# Patient Record
Sex: FEMALE | Race: WHITE | URBAN - METROPOLITAN AREA
[De-identification: names, ages, dates, MRNs, and addresses within clinical notes are randomized per-mention and may not be internally consistent; named-entity substitution may affect disease eponyms.]

---

## 2017-03-20 ENCOUNTER — HISTORICAL (OUTPATIENT)
Dept: RADIOLOGY | Facility: HOSPITAL | Age: 67
End: 2017-03-20

## 2017-03-22 ENCOUNTER — HISTORICAL (OUTPATIENT)
Dept: SURGERY | Facility: HOSPITAL | Age: 67
End: 2017-03-22

## 2017-04-11 ENCOUNTER — HISTORICAL (OUTPATIENT)
Dept: LAB | Facility: HOSPITAL | Age: 67
End: 2017-04-11

## 2017-04-24 ENCOUNTER — HISTORICAL (OUTPATIENT)
Dept: MEDSURG UNIT | Facility: HOSPITAL | Age: 67
End: 2017-04-24

## 2017-05-25 ENCOUNTER — HISTORICAL (OUTPATIENT)
Dept: LAB | Facility: HOSPITAL | Age: 67
End: 2017-05-25

## 2017-05-25 LAB
ABS NEUT (OLG): 4.69 X10(3)/MCL (ref 2.1–9.2)
BASOPHILS # BLD AUTO: 0.1 X10(3)/MCL (ref 0–0.2)
BASOPHILS NFR BLD AUTO: 1 %
BUN SERPL-MCNC: 13 MG/DL (ref 7–18)
CALCIUM SERPL-MCNC: 9.4 MG/DL (ref 8.5–10.1)
CHLORIDE SERPL-SCNC: 107 MMOL/L (ref 98–107)
CO2 SERPL-SCNC: 26 MMOL/L (ref 21–32)
CREAT SERPL-MCNC: 0.88 MG/DL (ref 0.55–1.02)
EOSINOPHIL # BLD AUTO: 0.2 X10(3)/MCL (ref 0–0.9)
EOSINOPHIL NFR BLD AUTO: 3 %
ERYTHROCYTE [DISTWIDTH] IN BLOOD BY AUTOMATED COUNT: 12.6 % (ref 11.5–17)
GLUCOSE SERPL-MCNC: 110 MG/DL (ref 74–106)
HCT VFR BLD AUTO: 40.5 % (ref 37–47)
HGB BLD-MCNC: 13.8 GM/DL (ref 12–16)
LYMPHOCYTES # BLD AUTO: 2.2 X10(3)/MCL (ref 0.6–4.6)
LYMPHOCYTES NFR BLD AUTO: 28 %
MCH RBC QN AUTO: 29.6 PG (ref 27–31)
MCHC RBC AUTO-ENTMCNC: 34.1 GM/DL (ref 33–36)
MCV RBC AUTO: 86.9 FL (ref 80–94)
MONOCYTES # BLD AUTO: 0.5 X10(3)/MCL (ref 0.1–1.3)
MONOCYTES NFR BLD AUTO: 6 %
NEUTROPHILS # BLD AUTO: 4.69 X10(3)/MCL (ref 1.4–7.9)
NEUTROPHILS NFR BLD AUTO: 60 %
PLATELET # BLD AUTO: 253 X10(3)/MCL (ref 130–400)
PMV BLD AUTO: 10.1 FL (ref 9.4–12.4)
POTASSIUM SERPL-SCNC: 4.2 MMOL/L (ref 3.5–5.1)
RBC # BLD AUTO: 4.66 X10(6)/MCL (ref 4.2–5.4)
SODIUM SERPL-SCNC: 146 MMOL/L (ref 136–145)
WBC # SPEC AUTO: 7.8 X10(3)/MCL (ref 4.5–11.5)

## 2017-05-30 ENCOUNTER — HISTORICAL (OUTPATIENT)
Dept: BARIATRICS | Facility: HOSPITAL | Age: 67
End: 2017-05-30

## 2017-06-09 ENCOUNTER — HISTORICAL (OUTPATIENT)
Dept: BARIATRICS | Facility: HOSPITAL | Age: 67
End: 2017-06-09

## 2017-06-22 ENCOUNTER — HISTORICAL (OUTPATIENT)
Dept: LAB | Facility: HOSPITAL | Age: 67
End: 2017-06-22

## 2017-06-22 LAB
ABS NEUT (OLG): 4.78 X10(3)/MCL (ref 2.1–9.2)
BASOPHILS # BLD AUTO: 0.1 X10(3)/MCL (ref 0–0.2)
BASOPHILS NFR BLD AUTO: 1 %
EOSINOPHIL # BLD AUTO: 0.3 X10(3)/MCL (ref 0–0.9)
EOSINOPHIL NFR BLD AUTO: 4 %
ERYTHROCYTE [DISTWIDTH] IN BLOOD BY AUTOMATED COUNT: 12.6 % (ref 11.5–17)
HCT VFR BLD AUTO: 43.2 % (ref 37–47)
HGB BLD-MCNC: 14.6 GM/DL (ref 12–16)
LYMPHOCYTES # BLD AUTO: 3.1 X10(3)/MCL (ref 0.6–4.6)
LYMPHOCYTES NFR BLD AUTO: 34 %
MCH RBC QN AUTO: 29.6 PG (ref 27–31)
MCHC RBC AUTO-ENTMCNC: 33.8 GM/DL (ref 33–36)
MCV RBC AUTO: 87.4 FL (ref 80–94)
MONOCYTES # BLD AUTO: 0.7 X10(3)/MCL (ref 0.1–1.3)
MONOCYTES NFR BLD AUTO: 8 %
NEUTROPHILS # BLD AUTO: 4.78 X10(3)/MCL (ref 2.1–9.2)
NEUTROPHILS NFR BLD AUTO: 53 %
PLATELET # BLD AUTO: 342 X10(3)/MCL (ref 130–400)
PMV BLD AUTO: 9.3 FL (ref 9.4–12.4)
RBC # BLD AUTO: 4.94 X10(6)/MCL (ref 4.2–5.4)
WBC # SPEC AUTO: 9 X10(3)/MCL (ref 4.5–11.5)

## 2017-06-26 ENCOUNTER — HISTORICAL (OUTPATIENT)
Dept: MEDSURG UNIT | Facility: HOSPITAL | Age: 67
End: 2017-06-26

## 2017-06-26 LAB — GROUP & RH: NORMAL

## 2017-06-27 LAB
ABS NEUT (OLG): 4.89 X10(3)/MCL (ref 2.1–9.2)
BASOPHILS # BLD AUTO: 0 X10(3)/MCL (ref 0–0.2)
BASOPHILS NFR BLD AUTO: 1 %
BUN SERPL-MCNC: 7 MG/DL (ref 7–18)
CALCIUM SERPL-MCNC: 8.3 MG/DL (ref 8.5–10.1)
CHLORIDE SERPL-SCNC: 109 MMOL/L (ref 98–107)
CO2 SERPL-SCNC: 27 MMOL/L (ref 21–32)
CREAT SERPL-MCNC: 0.74 MG/DL (ref 0.55–1.02)
EOSINOPHIL # BLD AUTO: 0 X10(3)/MCL (ref 0–0.9)
EOSINOPHIL NFR BLD AUTO: 0 %
ERYTHROCYTE [DISTWIDTH] IN BLOOD BY AUTOMATED COUNT: 12 % (ref 11.5–17)
GLUCOSE SERPL-MCNC: 130 MG/DL (ref 74–106)
HCT VFR BLD AUTO: 34.1 % (ref 37–47)
HGB BLD-MCNC: 11.4 GM/DL (ref 12–16)
LYMPHOCYTES # BLD AUTO: 1.4 X10(3)/MCL (ref 0.6–4.6)
LYMPHOCYTES NFR BLD AUTO: 21 %
MCH RBC QN AUTO: 29.3 PG (ref 27–31)
MCHC RBC AUTO-ENTMCNC: 33.4 GM/DL (ref 33–36)
MCV RBC AUTO: 87.7 FL (ref 80–94)
MONOCYTES # BLD AUTO: 0.4 X10(3)/MCL (ref 0.1–1.3)
MONOCYTES NFR BLD AUTO: 6 %
NEUTROPHILS # BLD AUTO: 4.89 X10(3)/MCL (ref 2.1–9.2)
NEUTROPHILS NFR BLD AUTO: 72 %
PLATELET # BLD AUTO: 302 X10(3)/MCL (ref 130–400)
PMV BLD AUTO: 9.4 FL (ref 9.4–12.4)
POTASSIUM SERPL-SCNC: 4.4 MMOL/L (ref 3.5–5.1)
RBC # BLD AUTO: 3.89 X10(6)/MCL (ref 4.2–5.4)
SODIUM SERPL-SCNC: 144 MMOL/L (ref 136–145)
WBC # SPEC AUTO: 6.8 X10(3)/MCL (ref 4.5–11.5)

## 2017-07-03 ENCOUNTER — HISTORICAL (OUTPATIENT)
Dept: OCCUPATIONAL THERAPY | Facility: HOSPITAL | Age: 67
End: 2017-07-03

## 2017-07-24 ENCOUNTER — HISTORICAL (OUTPATIENT)
Dept: RADIOLOGY | Facility: HOSPITAL | Age: 67
End: 2017-07-24

## 2017-08-01 ENCOUNTER — HISTORICAL (OUTPATIENT)
Dept: LAB | Facility: HOSPITAL | Age: 67
End: 2017-08-01

## 2017-08-01 ENCOUNTER — HISTORICAL (OUTPATIENT)
Dept: PREADMISSION TESTING | Facility: HOSPITAL | Age: 67
End: 2017-08-01

## 2017-08-01 LAB
ABS NEUT (OLG): 4.15 X10(3)/MCL (ref 2.1–9.2)
ALBUMIN SERPL-MCNC: 4.6 GM/DL (ref 3.4–5)
ALBUMIN/GLOB SERPL: 1.4 RATIO (ref 1.1–2)
ALP SERPL-CCNC: 116 UNIT/L (ref 38–126)
ALT SERPL-CCNC: 48 UNIT/L (ref 12–78)
AST SERPL-CCNC: 41 UNIT/L (ref 15–37)
BASOPHILS # BLD AUTO: 0.1 X10(3)/MCL (ref 0–0.2)
BASOPHILS NFR BLD AUTO: 1 %
BILIRUB SERPL-MCNC: 1.6 MG/DL (ref 0.2–1)
BILIRUBIN DIRECT+TOT PNL SERPL-MCNC: 0.3 MG/DL (ref 0–0.5)
BILIRUBIN DIRECT+TOT PNL SERPL-MCNC: 1.3 MG/DL (ref 0–0.8)
BUN SERPL-MCNC: 12 MG/DL (ref 7–18)
CALCIUM SERPL-MCNC: 9.8 MG/DL (ref 8.5–10.1)
CHLORIDE SERPL-SCNC: 105 MMOL/L (ref 98–107)
CO2 SERPL-SCNC: 28 MMOL/L (ref 21–32)
CREAT SERPL-MCNC: 1.17 MG/DL (ref 0.55–1.02)
EOSINOPHIL # BLD AUTO: 0.3 X10(3)/MCL (ref 0–0.9)
EOSINOPHIL NFR BLD AUTO: 3 %
ERYTHROCYTE [DISTWIDTH] IN BLOOD BY AUTOMATED COUNT: 12.7 % (ref 11.5–17)
GLOBULIN SER-MCNC: 3.4 GM/DL (ref 2.4–3.5)
GLUCOSE SERPL-MCNC: 84 MG/DL (ref 74–106)
HCT VFR BLD AUTO: 48 % (ref 37–47)
HGB BLD-MCNC: 16.5 GM/DL (ref 12–16)
LYMPHOCYTES # BLD AUTO: 3 X10(3)/MCL (ref 0.6–4.6)
LYMPHOCYTES NFR BLD AUTO: 37 %
MCH RBC QN AUTO: 29.9 PG (ref 27–31)
MCHC RBC AUTO-ENTMCNC: 34.4 GM/DL (ref 33–36)
MCV RBC AUTO: 87 FL (ref 80–94)
MONOCYTES # BLD AUTO: 0.6 X10(3)/MCL (ref 0.1–1.3)
MONOCYTES NFR BLD AUTO: 7 %
NEUTROPHILS # BLD AUTO: 4.15 X10(3)/MCL (ref 1.4–7.9)
NEUTROPHILS NFR BLD AUTO: 51 %
PLATELET # BLD AUTO: 272 X10(3)/MCL (ref 130–400)
PMV BLD AUTO: 10.4 FL (ref 9.4–12.4)
POTASSIUM SERPL-SCNC: 3.7 MMOL/L (ref 3.5–5.1)
PROT SERPL-MCNC: 8 GM/DL (ref 6.4–8.2)
RBC # BLD AUTO: 5.52 X10(6)/MCL (ref 4.2–5.4)
SODIUM SERPL-SCNC: 146 MMOL/L (ref 136–145)
WBC # SPEC AUTO: 8.1 X10(3)/MCL (ref 4.5–11.5)

## 2017-08-02 ENCOUNTER — HISTORICAL (OUTPATIENT)
Dept: SURGERY | Facility: HOSPITAL | Age: 67
End: 2017-08-02

## 2017-08-11 ENCOUNTER — HISTORICAL (OUTPATIENT)
Dept: SURGERY | Facility: HOSPITAL | Age: 67
End: 2017-08-11

## 2017-08-21 ENCOUNTER — HISTORICAL (OUTPATIENT)
Dept: PREADMISSION TESTING | Facility: HOSPITAL | Age: 67
End: 2017-08-21

## 2017-08-21 ENCOUNTER — HISTORICAL (OUTPATIENT)
Dept: LAB | Facility: HOSPITAL | Age: 67
End: 2017-08-21

## 2017-08-21 LAB
ABS NEUT (OLG): 2.18 X10(3)/MCL (ref 2.1–9.2)
ALBUMIN SERPL-MCNC: 3.8 GM/DL (ref 3.4–5)
ALBUMIN/GLOB SERPL: 1.2 RATIO (ref 1.1–2)
ALP SERPL-CCNC: 145 UNIT/L (ref 38–126)
ALT SERPL-CCNC: 66 UNIT/L (ref 12–78)
AST SERPL-CCNC: 62 UNIT/L (ref 15–37)
BASOPHILS # BLD AUTO: 0 X10(3)/MCL (ref 0–0.2)
BASOPHILS NFR BLD AUTO: 1 %
BILIRUB SERPL-MCNC: 0.8 MG/DL (ref 0.2–1)
BILIRUBIN DIRECT+TOT PNL SERPL-MCNC: 0.2 MG/DL (ref 0–0.5)
BILIRUBIN DIRECT+TOT PNL SERPL-MCNC: 0.6 MG/DL (ref 0–0.8)
BUN SERPL-MCNC: 15 MG/DL (ref 7–18)
CALCIUM SERPL-MCNC: 9.5 MG/DL (ref 8.5–10.1)
CHLORIDE SERPL-SCNC: 110 MMOL/L (ref 98–107)
CO2 SERPL-SCNC: 29 MMOL/L (ref 21–32)
CREAT SERPL-MCNC: 0.9 MG/DL (ref 0.55–1.02)
EOSINOPHIL # BLD AUTO: 0.2 X10(3)/MCL (ref 0–0.9)
EOSINOPHIL NFR BLD AUTO: 3 %
ERYTHROCYTE [DISTWIDTH] IN BLOOD BY AUTOMATED COUNT: 12.9 % (ref 11.5–17)
GLOBULIN SER-MCNC: 3.1 GM/DL (ref 2.4–3.5)
GLUCOSE SERPL-MCNC: 90 MG/DL (ref 74–106)
HCT VFR BLD AUTO: 41.8 % (ref 37–47)
HGB BLD-MCNC: 14 GM/DL (ref 12–16)
IRON SATN MFR SERPL: 38.4 % (ref 20–50)
IRON SERPL-MCNC: 83 MCG/DL (ref 50–175)
LYMPHOCYTES # BLD AUTO: 2.2 X10(3)/MCL (ref 0.6–4.6)
LYMPHOCYTES NFR BLD AUTO: 45 %
MCH RBC QN AUTO: 28.9 PG (ref 27–31)
MCHC RBC AUTO-ENTMCNC: 33.5 GM/DL (ref 33–36)
MCV RBC AUTO: 86.4 FL (ref 80–94)
MONOCYTES # BLD AUTO: 0.3 X10(3)/MCL (ref 0.1–1.3)
MONOCYTES NFR BLD AUTO: 6 %
NEUTROPHILS # BLD AUTO: 2.18 X10(3)/MCL (ref 2.1–9.2)
NEUTROPHILS NFR BLD AUTO: 44 %
PLATELET # BLD AUTO: 217 X10(3)/MCL (ref 130–400)
PMV BLD AUTO: 9.6 FL (ref 9.4–12.4)
POTASSIUM SERPL-SCNC: 4.1 MMOL/L (ref 3.5–5.1)
PREALB SERPL-MCNC: 19 MG/DL (ref 18–38)
PROT SERPL-MCNC: 6.9 GM/DL (ref 6.4–8.2)
RBC # BLD AUTO: 4.84 X10(6)/MCL (ref 4.2–5.4)
SODIUM SERPL-SCNC: 146 MMOL/L (ref 136–145)
TIBC SERPL-MCNC: 216 MCG/DL (ref 250–450)
TRANSFERRIN SERPL-MCNC: 187 MG/DL (ref 200–360)
VIT B12 SERPL-MCNC: 798 PG/ML (ref 193–986)
WBC # SPEC AUTO: 5 X10(3)/MCL (ref 4.5–11.5)

## 2017-08-25 ENCOUNTER — HISTORICAL (OUTPATIENT)
Dept: SURGERY | Facility: HOSPITAL | Age: 67
End: 2017-08-25

## 2022-04-30 NOTE — DISCHARGE SUMMARY
DISCHARGE DATE:  06/27/2017    ADMISSION DIAGNOSIS:    1. Dysphagia.  2. Gastric outlet obstruction.  3. History of gastric banding procedure.    DISCHARGE DIAGNOSIS:    1. Dysphagia.  2. Gastric outlet obstruction.  3. History of gastric banding procedure.    PROCEDURE:  Dr. Arce performed an endoscopic removal of an esophageal stent and replacement of esophageal stent on 6/26/2017.    HOSPITAL COURSE:  Ms. Rogers is a 67-year-old female well known to our service that presented for elective procedure.  Upon completion of procedure, patient was transferred from the recovery room to the post surgical floor for further care and treatment.  Diet is advanced as tolerated to clear liquids on postop day one. Once patient tolerating PO and hemodynamically stable, she was prepared for discharge on postop day one.  She was scheduled for surgery for endoscopic stent removal on July 3, 2017 and is encouraged to keep this appointment as scheduled.       Discharge instructions given regarding active, diet, and medications.  Patient met with bariatric dietician prior to discharge regarding postoperative diet.    DISPOSITION:  Home with family.       Dictated by Princess Sheehan NP      ______________________________  Ziggy Arce MD PG/JANET  DD:  06/27/2017  Time:  10:50AM  DT:  06/28/2017  Time:  02:37PM  Job #:  [id number]

## 2022-04-30 NOTE — OP NOTE
Patient:   Chela Rogesr            MRN: [id number]            FIN: [id number]               Age:   66 years     Sex:  Female     :  1950   Associated Diagnoses:   HEARTBURN   Author:   Ziggy Arce MD      Operative Note   Operative Information   Date/ Time:  3/22/2017 22:22:00.     Procedures Performed: Procedure Code   Esophagogastroduodenoscopy on 3/22/2017 at 66 Years.  Comments:  3/22/2017 10:06 - Chris WALDRON, [place]  auto-populated from documented surgical case, Esophagogastroduodenoscopy.     Preoperative Diagnosis: HEARTBURN (DLK99-PM R12).     Postoperative Diagnosis: HEARTBURN (CEY20-VX R12).     Surgeon: Ziggy Arce MD.     Anesthesia: MAC.     Description of Procedure/Findings/    Complications: Patient was taken to the OR.  The patient's throat was aneshtetized.  MAC was utilized for anesthesia.   was easily passed.  Findings were as follows:  -Oropharynx: normal  -Airway: normal  -Esophagus: dilated distally  -Stomach: no HH, evidence of gastric band, no erosion  -Duodenum: normal.     Esimated blood loss: No blood loss.     Complications: None.

## 2022-04-30 NOTE — DISCHARGE SUMMARY
DISCHARGE DATE:  07/04/2017    ADMISSION DIAGNOSIS:    1. 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   2. Nausea and vomiting.    3. Dysphagia.    DISCHARGE DIAGNOSIS:    1. 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   2. Nausea and vomiting.    3. Dysphagia.    PROCEDURE:  EGD, fluoroscopy less than 1 hour, endoscopic removal of endoscopically placed esophageal stent, endoscopic removal of eroded fixed gastric band.    CLINICAL HISTORY:  The patient had a remote history of a fixed gastric band causing esophageal obstruction, dysphagia, nausea and vomiting.  Esophageal stent was endoscopically placed for erosion of gastric band.  This procedure was performed on an outpatient basis.  It was felt after surgery in the patient's best interest that she be kept over night for observation.  Initially, she did not agree to this, but after discussing potential postop complications, she elected to stay over night for observation as recommended by Dr. Arce.  Diet was advanced as tolerated.  On postop day #1, she was tolerating po and states she feels great with no issues.  She was prepared for discharge.  Dietitian will contact the patient tomorrow via phone call to evaluate her diet progression.  Follow-up appointment was arranged in the office in 2 weeks with Dr. Arce.    DISPOSITION:  Home with family.    DICTATED BY:  Princess Sheehan NP      ______________________________  Ziggy Arce MD    PG/CM  DD:  07/04/2017  Time:  08:41AM  DT:  07/06/2017  Time:  10:01AM  Job #:  [id number]

## 2022-04-30 NOTE — OP NOTE
Patient:   Chela Rogers            MRN: [id number]            FIN: [id number]               Age:   67 years     Sex:  Female     :  1950   Associated Diagnoses:   Gastric outlet obstruction; Dysphagia   Author:   Ziggy Arce MD      Operative Note   Operative Information   Date/ Time:  2017 22:00:00.     Procedures Performed: Esophagogastroduodenoscopy.     Preoperative Diagnosis: Gastric outlet obstruction (GBM82-YY K31.1), Dysphagia (BHV27-YV R13.10).     Postoperative Diagnosis: Gastric outlet obstruction (XNL81-XB K31.1), Dysphagia (SSN28-ZN R13.10).     Surgeon: Ziggy Arce MD.     Anesthesia: MAC.     Description of Procedure/Findings/    Complications: Patient was taken to the OR.  The patient's throat was aneshtetized.  MAC was utilized for anesthesia.   was easily passed.  Findings were as follows:  -Oropharynx: normal  -Airway: normal  -Esophagus: normal  -Stomach: partially eroded gastric band  -Duodenum: normal.     Esimated blood loss: No blood loss.     Complications: None.

## 2022-04-30 NOTE — OP NOTE
Patient:   Chela Rogers            MRN: [id number]            FIN: [id number]               Age:   67 years     Sex:  Female     :  1950   Associated Diagnoses:   Vomiting; Gastric stenosis; Dysphagia   Author:   Ziggy Arce MD      Operative Note   Operative Information   Date/ Time:  2017 12:28:00.     Procedures Performed: Procedure Code   Laparoscopy, surgical; gastrostomy, without construction of gastric tube (eg, Jennyfer procedure) (separate procedure) (12006).  Esophagogastroduodenoscopy, flexible, transoral; with dilation of gastric/duodenal stricture(s) (eg, balloon, bougie) (06326)..     Preoperative Diagnosis: Vomiting (YCH21-NB R11.10), Gastric stenosis (ZVR55-AQ K31.2), Dysphagia (IDW97-RJ R13.10).     Postoperative Diagnosis: Vomiting (MLW89-UN R11.10), Gastric stenosis (PAO44-AH K31.2), Dysphagia (XEO24-UM R13.10).     Surgeon: Ziggy Arce MD.     Assistant: Princess Londono.     Anesthesia: General Endotracheal .     Description of Procedure/Findings/    Complications: Patient was taken to operating room and placed on the table in the supine position.  After induction of anesthesia the throat was anesthetized and a pediatric endoscope was placed easily through esophagus there were some retained pills and saliva the stomach was entered and approximately 2 cm distally in the stomach there is an obvious stenosis which is previously been dilated.  Through the scope dilators were passed dilated up to 15 mm arthroscope was unable to traverse this stenosis.  This portion of the procedure was aborted the scope was removed.  The patient was placed under general anesthesia.  The abdomen was prepped and draped in usual sterile fashion.  Optical tipped trocar reuse the access the peritoneal cavity additional working ports were placed under direct vision.  The stomach was then sutured to the parietal peritoneum with interrupted 0 Ethibond sutures.  Gastrotomy was  performed and a 24 Danish gastrostomy tube was placed through incisions in the abdominal wall into the gastrotomy.  2 additional sutures were then placed to hold the stomach up against the parietal peritoneum.  The balloon was insufflated and pulled up against stomach and abdominal wall.  Pneumoperitoneum was released trochars removed the tube sutured to the skin patient tolerated procedure well has brought to recovery in stable condition..

## 2022-04-30 NOTE — H&P
Patient:   Chela Rogers            MRN: [id number]            FIN: [id number]               Age:   67 years     Sex:  Female     :  1950   Associated Diagnoses:   None   Author:   Yazmin ENGLISH, [facility]   The H&P was reviewed, the patient was examined, and there are no changes to the patient's condition..

## 2022-04-30 NOTE — OP NOTE
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     Procedures Performed: Procedure Code   Esophagogastroduodenoscopy on 2017 at 67 Years.  Comments:  2017 17:16 - Lejeune RN, Mikhail Corbett  auto-populated from documented surgical case  Balloon Dilation Gastrointestional (None) on 2017 at 67 Years.  Comments:  2017 17:16 - Lejeune RN, Mikhail Corbett  auto-populated from documented surgical case, Esophagogastroduodenoscopy.     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     Surgeon: Ziggy Arce MD.     Anesthesia: MAC.     Description of Procedure/Findings/    Complications: Patient was taken to the OR.  The patient's throat was aneshtetized.  MAC was utilized for anesthesia.   was easily passed.  Findings were as follows:  -Oropharynx: normal  -Airway: normal  -Esophagus: normal  -Stomach: normal proximal pouch, tight stricture at 45 cm dilated with TTS dilator up to 11 mm, stenosis traversed, no ulcer, no mass  -Duodenum: normal.     Esimated blood loss: No blood loss.     Complications: None.

## 2022-04-30 NOTE — OP NOTE
* Final Report *  EGD w dilatation     Patient:   Chela Rogers            MRN: [id number]            FIN: [id number]               Age:   67 years     Sex:  Female     :  1950   Associated Diagnoses:   None   Author:   Ziggy Arce MD      Operative Note   Operative Information   Date/ Time:  2017 22:22:00.     Procedures Performed: Esophagogastroduodenoscopy, w dilatation.     Preoperative Diagnosis.     Postoperative Diagnosis.     Surgeon: Ziggy Arce MD.     Anesthesia: MAC.     Description of Procedure/Findings/    Complications: Patient was taken to the OR.  The patient's throat was aneshtetized.  MAC was utilized for anesthesia.   was easily passed.  Findings were as follows:  -Oropharynx: normal  -Airway: normal  -Esophagus: normal  --Stomach: normal proximal pouch, tight stricture at 45 cm dilated with TTS dilator up to 16  mm, stenosis traversed, no ulcer, no mass    -Duodenum: normal.     Esimated blood loss: No blood loss.     Complications: None.       Result type: Operative Report  Result date: 2017 17:50 CDT  Result status: Auth (Verified)  Result title: EGD w dilatation  Performed by: Ziggy Arce MD on 2017 13:05 CDT  Verified by: Ziggy Arce MD on 2017 13:05 CDT  Encounter info: [id number], [facility], Day Surgery, 2017 - 2017    Moved to the correct Fin by HIM

## 2022-04-30 NOTE — OP NOTE
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     Procedures Performed: Esophagogastroscoduodenoscopy  Fluoroscopy less than one hour  Endoscopic removal of endoscopically placed stent  Endoscopic removal of eroded fixed gastric band.     Indications: dysphagia, esophageal obstruction, remote history of bariatric surgery.     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     Surgeon: Ziggy Arce MD.     Assistant: Khanh Murray MD.     Anesthesia: General Endotracheal .     [place] Removed: Endoscopically placed stent, fixed gastric band.     Description of Procedure/Findings/    Complications: Patient was taken to the operating room.  Pre operative consent obtained to perform endoscopic removal of eroded gastric band.  Placed under general anesthesia.   passed easily.  Flouroscopy performed.  Stent noted to be in stomach with gastric band on stent.  The stent was retrieved under flouroscopic guidance. Scope re passed.  Minimal blood noted in stomach, however no active hemorrhage.  Ulceration noted at area of stent erosion.  Stent and band sent to pathology. .     Esimated blood loss: No blood loss.     Findings: as stated.     Complications: None.